# Patient Record
Sex: MALE | Race: WHITE | NOT HISPANIC OR LATINO | URBAN - METROPOLITAN AREA
[De-identification: names, ages, dates, MRNs, and addresses within clinical notes are randomized per-mention and may not be internally consistent; named-entity substitution may affect disease eponyms.]

---

## 2017-01-01 ENCOUNTER — EMERGENCY (EMERGENCY)
Facility: HOSPITAL | Age: 27
LOS: 1 days | Discharge: HOME | End: 2017-01-01
Attending: EMERGENCY MEDICINE | Admitting: EMERGENCY MEDICINE
Payer: COMMERCIAL

## 2017-01-01 VITALS
DIASTOLIC BLOOD PRESSURE: 83 MMHG | SYSTOLIC BLOOD PRESSURE: 127 MMHG | RESPIRATION RATE: 18 BRPM | TEMPERATURE: 98 F | HEART RATE: 89 BPM | OXYGEN SATURATION: 97 %

## 2017-01-01 VITALS
TEMPERATURE: 98 F | OXYGEN SATURATION: 98 % | DIASTOLIC BLOOD PRESSURE: 95 MMHG | SYSTOLIC BLOOD PRESSURE: 145 MMHG | HEART RATE: 106 BPM | RESPIRATION RATE: 16 BRPM | WEIGHT: 220.02 LBS | HEIGHT: 70 IN

## 2017-01-01 DIAGNOSIS — R07.81 PLEURODYNIA: ICD-10-CM
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
## 2017-01-01 DIAGNOSIS — Z79.899 OTHER LONG TERM (CURRENT) DRUG THERAPY: ICD-10-CM

## 2017-01-01 DIAGNOSIS — Z23 ENCOUNTER FOR IMMUNIZATION: ICD-10-CM

## 2017-01-01 DIAGNOSIS — S60.413A ABRASION OF LEFT MIDDLE FINGER, INITIAL ENCOUNTER: ICD-10-CM

## 2017-01-01 DIAGNOSIS — S60.415A ABRASION OF LEFT RING FINGER, INITIAL ENCOUNTER: ICD-10-CM
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
## 2017-01-01 DIAGNOSIS — Z88.6 ALLERGY STATUS TO ANALGESIC AGENT: ICD-10-CM
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
## 2017-01-01 PROCEDURE — 71020: CPT | Mod: 26

## 2017-01-01 PROCEDURE — 73130 X-RAY EXAM OF HAND: CPT | Mod: 26

## 2017-01-01 PROCEDURE — 71046 X-RAY EXAM CHEST 2 VIEWS: CPT

## 2017-01-01 PROCEDURE — 90471 IMMUNIZATION ADMIN: CPT

## 2017-01-01 PROCEDURE — 71100 X-RAY EXAM RIBS UNI 2 VIEWS: CPT | Mod: 26

## 2017-01-01 PROCEDURE — 99284 EMERGENCY DEPT VISIT MOD MDM: CPT | Mod: 25

## 2017-01-01 PROCEDURE — 90715 TDAP VACCINE 7 YRS/> IM: CPT

## 2017-01-01 PROCEDURE — 71100 X-RAY EXAM RIBS UNI 2 VIEWS: CPT | Mod: 26,LT

## 2017-01-01 PROCEDURE — 73130 X-RAY EXAM OF HAND: CPT | Mod: 26,LT

## 2017-01-01 PROCEDURE — 71100 X-RAY EXAM RIBS UNI 2 VIEWS: CPT

## 2017-01-01 PROCEDURE — 73130 X-RAY EXAM OF HAND: CPT

## 2017-01-01 RX ORDER — ACETAMINOPHEN 500 MG
650 TABLET ORAL ONCE
Qty: 0 | Refills: 0 | Status: COMPLETED | OUTPATIENT
Start: 2017-01-01 | End: 2017-01-01

## 2017-01-01 RX ORDER — TETANUS TOXOID, REDUCED DIPHTHERIA TOXOID AND ACELLULAR PERTUSSIS VACCINE, ADSORBED 5; 2.5; 8; 8; 2.5 [IU]/.5ML; [IU]/.5ML; UG/.5ML; UG/.5ML; UG/.5ML
0.5 SUSPENSION INTRAMUSCULAR ONCE
Qty: 0 | Refills: 0 | Status: COMPLETED | OUTPATIENT
Start: 2017-01-01 | End: 2017-01-01

## 2017-01-01 RX ORDER — MONTELUKAST 4 MG/1
0 TABLET, CHEWABLE ORAL
Qty: 0 | Refills: 0 | COMMUNITY

## 2017-01-01 RX ORDER — SODIUM CHLORIDE 9 MG/ML
3 INJECTION INTRAMUSCULAR; INTRAVENOUS; SUBCUTANEOUS ONCE
Qty: 0 | Refills: 0 | Status: DISCONTINUED | OUTPATIENT
Start: 2017-01-01 | End: 2017-01-01

## 2017-01-01 RX ORDER — FEXOFENADINE HCL 30 MG
0 TABLET ORAL
Qty: 0 | Refills: 0 | COMMUNITY

## 2017-01-01 RX ORDER — TETANUS AND DIPHTHERIA TOXOIDS ADSORBED 2; 2 [LF]/.5ML; [LF]/.5ML
0.5 INJECTION INTRAMUSCULAR ONCE
Qty: 0 | Refills: 0 | Status: DISCONTINUED | OUTPATIENT
Start: 2017-01-01 | End: 2017-01-01

## 2017-01-01 RX ADMIN — TETANUS TOXOID, REDUCED DIPHTHERIA TOXOID AND ACELLULAR PERTUSSIS VACCINE, ADSORBED 0.5 MILLILITER(S): 5; 2.5; 8; 8; 2.5 SUSPENSION INTRAMUSCULAR at 03:19

## 2017-01-01 RX ADMIN — Medication 650 MILLIGRAM(S): at 03:19

## 2017-01-01 NOTE — ED PROVIDER NOTE - NEUROLOGICAL, MLM
Alert and oriented, no focal deficits, no motor or sensory deficits. Alert and oriented, no focal deficits, no motor deficits.

## 2017-01-01 NOTE — ED PROVIDER NOTE - SKIN, MLM
Abrasions noted to left ring and middle fingers on dorsal aspect of hand. TTP on the PIP joint of the ring finger. Abrasions noted to left ring and middle fingers on dorsal aspect of hand. TTP on the PIP joint of the ring finger. Other abrasions and bruising to dorsum of hand noted. Palmar aspect of hand normal.

## 2017-01-01 NOTE — ED PROVIDER NOTE - MEDICAL DECISION MAKING DETAILS
Pt presents with chest wall pain and hand pain after mechanical fall. No LOC/ head trauma. XRs with no acute findings. Pt advised pain meds prn and to f/up outpt. Pt presents with chest wall pain and hand pain after mechanical fall. No LOC/ head trauma. XRs with no acute findings. Tetanus updated. Pt advised OTC pain meds prn pain and to f/up outpt.

## 2017-01-01 NOTE — ED PROVIDER NOTE - PSYCHIATRIC, MLM
Alert and oriented to person, place, time/situation. normal mood and affect. no apparent risk to self or others. Alert and oriented. normal mood and affect. no apparent risk to self or others.

## 2017-01-01 NOTE — ED PROVIDER NOTE - OBJECTIVE STATEMENT
26y M Pt RHD with no PMHx present to ED s/p trip and fall on sidewalk today around 22:00 c/o left-sided chest pain and pain and swelling to left 4th finger. Pt fell directly onto left side of chest and bent 3rd and 4th fingers backward. Finger pain immediate and chest pain gradually worsening. Associated lacerations over dorsal left hand, minimal active bleeding. Denies head trauma and LOC. Denies neck pain and SOB. Pt admits to drinking ETOH before fall. Unsure of last tetanus. 26y M Pt RHD with no PMHx present to ED s/p trip and fall on sidewalk today around 22:00 c/o left-sided chest pain and pain and swelling to left 4th finger. Pt fell directly onto left side of chest and bent 3rd and 4th fingers backward. Finger pain immediate and chest pain gradually worsening. Associated lacerations over dorsal left hand around knuckles of 3rd and 4th fingers, minimal active bleeding. Denies head trauma and LOC. Denies neck pain and SOB. Pt admits to drinking ETOH before fall. Unsure of last tetanus. 26y M Pt, right side dominant, with no PMHx present to ED s/p trip and fall on sidewalk today around 22:00 c/o left-sided chest pain and pain and swelling to left 4th finger. Pt fell directly onto left side of chest and bent 3rd and 4th fingers backward. Finger pain immediate and chest pain gradually worsening. Associated abrasions over dorsal left hand around knuckles of 3rd and 4th fingers, minimal active bleeding. Denies head trauma and LOC. Denies neck pain and SOB. Pt admits to drinking ETOH before fall. Unsure of last tetanus.

## 2017-01-01 NOTE — ED ADULT NURSE NOTE - OBJECTIVE STATEMENT
Patient stated that he tripped and fell 3 hrs PTA to the ED complains of L anterior rib pain worst on some movements and L 4th finger swelling with limited ROM.  Patient is alert and oriented times 3 reported drinking a bit.

## 2017-01-01 NOTE — ED PROVIDER NOTE - CARDIAC, MLM
Normal rate, regular rhythm.  Heart sounds S1, S2.  No murmurs, rubs or gallops. Normal rate, regular rhythm.  Heart sounds S1, S2.

## 2017-01-01 NOTE — ED PROVIDER NOTE - PHYSICAL EXAMINATION
pe - left chest walll ttp lerft lat rib   lhand -  from; swelling to ring w bruisin escp prox     interphslasneal of ring finger  othr abras to dirsum aspect hand also bruising   palmar fine

## 2017-01-01 NOTE — ED ADULT TRIAGE NOTE - ARRIVAL INFO ADDITIONAL COMMENTS
pt c/o left sided rib pain s/p fall 2 hours ago, denies LOC or head trauma. he also c/o left hand pain and noted with swollen left ring finger. he denies chest pain or SOB

## 2017-01-01 NOTE — ED PROVIDER NOTE - DIAGNOSTIC INTERPRETATION
CXR: no pneumothorax, no infiltrate, no effusion, no bony abnormalities   Right hand: no fractures, no dislocations, no subluxation CXR: no pneumothorax, no infiltrate, no effusion, no bony abnormalities   XRs left hand: no fractures, no dislocations, no subluxation  XRay left rib series: no fracture, no dislocation

## 2017-01-01 NOTE — ED PROVIDER NOTE - CONSTITUTIONAL, MLM
normal... Well appearing, well nourished, awake, alert, oriented to person, place, time/situation and in no apparent distress. Well appearing, well nourished, awake, alert, oriented and in no apparent distress.

## 2017-01-01 NOTE — ED PROVIDER NOTE - MUSCULOSKELETAL, MLM
TTP left lateral ribs. No crepitus, no step-offs.  No midline c-spine tenderness. TTP left lateral ribs. No crepitus, no step-offs.  No midline c-spine tenderness. Full ROM. Swelling to ring finger with bruising especially on proximal interphalangeal joint of ring finger. TTP left lateral ribs. No crepitus, no step-offs.  No midline c-spine tenderness. Full ROM. Swelling to left ring finger with bruising especially on proximal interphalangeal joint of ring finger.

## 2017-05-03 NOTE — ED ADULT TRIAGE NOTE - PAIN: PRESENCE, MLM
ambien 5mg (was transferred to [place] in march and we can't transfer it back)      Last Written Prescription Date:  1/31/17  Last Fill Quantity: 30,   # refills: 5  Last Office Visit with [facility] prescribing provider: 1/31/17  Future Office visit:       Routing refill request to provider for review/approval because:  Drug not on the [facility] refill protocol or controlled substance  Thank you very kindly!  Laura Coulter, [facility] [place]     complains of pain/discomfort/left rib